# Patient Record
Sex: MALE | Race: WHITE | NOT HISPANIC OR LATINO | Employment: FULL TIME | URBAN - METROPOLITAN AREA
[De-identification: names, ages, dates, MRNs, and addresses within clinical notes are randomized per-mention and may not be internally consistent; named-entity substitution may affect disease eponyms.]

---

## 2023-04-05 ENCOUNTER — HOSPITAL ENCOUNTER (OUTPATIENT)
Dept: DATA CONVERSION | Facility: HOSPITAL | Age: 63
End: 2023-04-05
Attending: PHYSICAL MEDICINE & REHABILITATION | Admitting: PHYSICAL MEDICINE & REHABILITATION
Payer: COMMERCIAL

## 2023-04-05 DIAGNOSIS — M54.16 RADICULOPATHY, LUMBAR REGION: ICD-10-CM

## 2023-06-07 ENCOUNTER — HOSPITAL ENCOUNTER (OUTPATIENT)
Dept: DATA CONVERSION | Facility: HOSPITAL | Age: 63
End: 2023-06-07
Attending: PHYSICAL MEDICINE & REHABILITATION | Admitting: PHYSICAL MEDICINE & REHABILITATION
Payer: COMMERCIAL

## 2023-06-07 DIAGNOSIS — M54.16 RADICULOPATHY, LUMBAR REGION: ICD-10-CM

## 2023-08-23 ENCOUNTER — HOSPITAL ENCOUNTER (OUTPATIENT)
Dept: DATA CONVERSION | Facility: HOSPITAL | Age: 63
Discharge: HOME | End: 2023-08-23
Payer: COMMERCIAL

## 2023-08-23 DIAGNOSIS — R22.31 LOCALIZED SWELLING, MASS AND LUMP, RIGHT UPPER LIMB: ICD-10-CM

## 2023-09-14 NOTE — H&P
History & Physical Reviewed:   I have reviewed the History and Physical dated:  05-Apr-2023   History and Physical reviewed and relevant findings noted. Patient examined to review pertinent physical  findings.: No significant changes   Home Medications Reviewed: no changes noted   Allergies Reviewed: no changes noted       ERAS (Enhanced Recovery After Surgery):  ·  ERAS Patient: no     Consent:   COVID-19 Consent:  ·  COVID-19 Risk Consent Surgeon has reviewed key risks related to the risk of magaly COVID-19 and if they contract COVID-19 what the risks are.       Electronic Signatures:  Bhaskar Toledo)  (Signed 05-Apr-2023 08:12)   Authored: History & Physical Reviewed, ERAS, Consent,  Note Completion      Last Updated: 05-Apr-2023 08:12 by Bhaskar Toledo)

## 2023-09-30 NOTE — H&P
History & Physical Reviewed:   I have reviewed the History and Physical dated:  07-Jun-2023   History and Physical reviewed and relevant findings noted. Patient examined to review pertinent physical  findings.: No significant changes   Home Medications Reviewed: no changes noted   Allergies Reviewed: no changes noted       ERAS (Enhanced Recovery After Surgery):  ·  ERAS Patient: no     Consent:   COVID-19 Consent:  ·  COVID-19 Risk Consent Surgeon has reviewed key risks related to the risk of magaly COVID-19 and if they contract COVID-19 what the risks are.       Electronic Signatures:  Bhaskar Toledo)  (Signed 07-Jun-2023 07:40)   Authored: History & Physical Reviewed, ERAS, Consent,  Note Completion      Last Updated: 07-Jun-2023 07:40 by Bhaskar Toledo)

## 2023-12-13 ENCOUNTER — OFFICE VISIT (OUTPATIENT)
Dept: OTOLARYNGOLOGY | Facility: CLINIC | Age: 63
End: 2023-12-13
Payer: COMMERCIAL

## 2023-12-13 VITALS — WEIGHT: 185 LBS | BODY MASS INDEX: 29.03 KG/M2 | TEMPERATURE: 97.3 F | HEIGHT: 67 IN

## 2023-12-13 DIAGNOSIS — H92.11 OTORRHEA OF RIGHT EAR: ICD-10-CM

## 2023-12-13 DIAGNOSIS — H61.23 BILATERAL IMPACTED CERUMEN: Primary | ICD-10-CM

## 2023-12-13 DIAGNOSIS — H69.91 DYSFUNCTION OF RIGHT EUSTACHIAN TUBE: ICD-10-CM

## 2023-12-13 DIAGNOSIS — H90.3 ASYMMETRIC SENSORINEURAL DEAFNESS: ICD-10-CM

## 2023-12-13 PROCEDURE — 1036F TOBACCO NON-USER: CPT | Performed by: OTOLARYNGOLOGY

## 2023-12-13 PROCEDURE — 99214 OFFICE O/P EST MOD 30 MIN: CPT | Performed by: OTOLARYNGOLOGY

## 2023-12-13 ASSESSMENT — PATIENT HEALTH QUESTIONNAIRE - PHQ9
2. FEELING DOWN, DEPRESSED OR HOPELESS: NOT AT ALL
1. LITTLE INTEREST OR PLEASURE IN DOING THINGS: NOT AT ALL
SUM OF ALL RESPONSES TO PHQ9 QUESTIONS 1 & 2: 0

## 2023-12-13 NOTE — PROGRESS NOTES
"Chief Complaint   Patient presents with    Follow-up     LOV 5/23 RT TUBE CK      Date of Evaluation: 12/13/2023   HPI  Angelo Lew is a 63 y.o. male deaf in the right ear with a tube in place  History: former patient of Dr. Davis. He had a right sudden sensorineural hearing loss resulting in a profound hearing loss. He also had right-sided eustachian tube dysfunction requiring placement of a T-tube January 2019. He does swim. He has occasional otorrhea. No change in his hearing recently        Past Medical History:   Diagnosis Date    Other specified disorders of eustachian tube, right ear 12/28/2021    Dysfunction of right eustachian tube    Other specified disorders of eustachian tube, right ear 12/28/2021    Dysfunction of right eustachian tube      Past Surgical History:   Procedure Laterality Date    CT ANGIO CORONARY ART WITH HEARTFLOW IF SCORE >30%  11/27/2020    CT HEART CORONARY ANGIOGRAM 11/27/2020 [facility] ANCILLARY LEGACY    OTHER SURGICAL HISTORY  05/28/2021    Ankle surgery    OTHER SURGICAL HISTORY  05/28/2021    Back surgery    OTHER SURGICAL HISTORY  12/28/2021    Myringotomy with tube placement    TONSILLECTOMY            Medications:   Current Outpatient Medications   Medication Instructions    aspirin-calcium carbonate 81 mg-300 mg calcium(777 mg) tablet oral    LORazepam (Ativan) 0.5 mg tablet TAKE 1 TABLET BY MOUTH TWO TIMES A DAY AS NEEDED FOR ANXIETY    traMADol (Ultram) 50 mg tablet TAKE 1 TABLET BY MOUTH EVERY 6 HOURS AS NEEDED FOR PAIN        Allergies:  No Known Allergies     Physical Exam:  Last Recorded Vitals  Temperature 36.3 °C (97.3 °F), height 1.702 m (5' 7\"), weight 83.9 kg (185 lb).  []General appearance: Well-developed, well-nourished in no acute distress, conversant with normal voice quality    Head/face: No erythema or edema or facial tenderness, and normal facial nerve function bilaterally    External ear: Clear external auditory canals with normal pinnae, bilateral cerumen " impactions removed.  There is purulence still around the right tube on the lateral surface of the tympanic membrane  Tube status: Right T-tube in place  Middle ear: Tympanic membranes intact and mobile, middle ears normal.  Tympanic membrane perforation: N/A  Mastoid bowl: N/A  Hearing: Normal conversational awareness at normal speech thresholds    Nose visualized using: Anterior rhinoscopy  Nasal dorsum: Nontraumatic midline appearance  Septum: Midline, nonobstructing  Inferior turbinates: Normal, pink  Secretions: Dry    Oral cavity and oropharynx: Normal  Teeth: Good condition  Floor of mouth: without lesions  Palate: Normal hard palate, soft palate and uvula  Oropharynx: Clear, no lesions present  Buccal mucosa: Normal without masses or lesions  Lips: Normal    Nasopharynx: Inadequate mirror exam secondary to gag/anatomy    Neck:  Salivary glands: Normal bilateral parotid and submandibular glands by inspection and palpation.  Non-thyroid masses: No palpable masses or significant lymphadenopathy  Trachea: Midline  Thyroid: No thyromegaly or palpable nodules  Temporomandibular joint: Nontender  Cervical range of motion: Normal    Neurologic exam: Alert and oriented x3, appropriate affect.  Cranial nerves II-XII normal bilaterally  Extraocular movement: Extraocular movement intact, normal gaze alignment        Angelo was seen today for follow-up.  Diagnoses and all orders for this visit:  Bilateral impacted cerumen (Primary)  Dysfunction of right eustachian tube  Asymmetric sensorineural deafness  Otorrhea of right ear       PLAN  Ciprodex in the right ear for 4 days.  Recheck in 6 months with an audiogram    Rolando Dyer MD

## 2024-04-19 ENCOUNTER — TELEPHONE (OUTPATIENT)
Dept: PRIMARY CARE | Facility: CLINIC | Age: 64
End: 2024-04-19
Payer: COMMERCIAL

## 2024-04-19 DIAGNOSIS — Z13.220 LIPID SCREENING: ICD-10-CM

## 2024-04-19 DIAGNOSIS — Z12.5 SCREENING PSA (PROSTATE SPECIFIC ANTIGEN): ICD-10-CM

## 2024-04-19 DIAGNOSIS — Z00.00 WELL ADULT EXAM: ICD-10-CM

## 2024-06-07 ENCOUNTER — OFFICE VISIT (OUTPATIENT)
Dept: PRIMARY CARE | Facility: CLINIC | Age: 64
End: 2024-06-07
Payer: COMMERCIAL

## 2024-06-07 ENCOUNTER — PHARMACY VISIT (OUTPATIENT)
Dept: PHARMACY | Facility: CLINIC | Age: 64
End: 2024-06-07
Payer: COMMERCIAL

## 2024-06-07 VITALS
HEIGHT: 67 IN | OXYGEN SATURATION: 98 % | WEIGHT: 184 LBS | BODY MASS INDEX: 28.88 KG/M2 | TEMPERATURE: 96.3 F | DIASTOLIC BLOOD PRESSURE: 90 MMHG | HEART RATE: 89 BPM | SYSTOLIC BLOOD PRESSURE: 138 MMHG

## 2024-06-07 DIAGNOSIS — L23.7 ALLERGIC DERMATITIS DUE TO POISON OAK: Primary | ICD-10-CM

## 2024-06-07 PROBLEM — D18.03 HEMANGIOMA OF LIVER: Status: RESOLVED | Noted: 2024-06-07 | Resolved: 2024-06-07

## 2024-06-07 PROBLEM — F99 INSOMNIA DUE TO OTHER MENTAL DISORDER: Status: ACTIVE | Noted: 2018-08-29

## 2024-06-07 PROBLEM — F51.05 INSOMNIA DUE TO OTHER MENTAL DISORDER: Status: ACTIVE | Noted: 2018-08-29

## 2024-06-07 PROBLEM — N52.9 ERECTILE DYSFUNCTION: Status: ACTIVE | Noted: 2024-06-07

## 2024-06-07 PROBLEM — S82.409A FRACTURE OF FIBULA: Status: RESOLVED | Noted: 2024-06-07 | Resolved: 2024-06-07

## 2024-06-07 PROBLEM — H92.11 OTORRHEA OF RIGHT EAR: Status: RESOLVED | Noted: 2024-06-07 | Resolved: 2024-06-07

## 2024-06-07 PROBLEM — R73.01 IMPAIRED FASTING GLUCOSE: Status: ACTIVE | Noted: 2018-10-09

## 2024-06-07 PROBLEM — I31.9 PERICARDITIS (HHS-HCC): Status: RESOLVED | Noted: 2024-06-07 | Resolved: 2024-06-07

## 2024-06-07 PROBLEM — S92.909K NONUNION OF FRACTURE OF ANKLE AND FOOT: Status: RESOLVED | Noted: 2024-06-07 | Resolved: 2024-06-07

## 2024-06-07 PROBLEM — M54.10 RADICULOPATHY: Status: ACTIVE | Noted: 2023-01-06

## 2024-06-07 PROBLEM — S82.899K NONUNION OF FRACTURE OF ANKLE AND FOOT: Status: RESOLVED | Noted: 2024-06-07 | Resolved: 2024-06-07

## 2024-06-07 PROBLEM — F41.9 ANXIETY DISORDER: Status: ACTIVE | Noted: 2018-08-29

## 2024-06-07 PROBLEM — K64.8 PROLAPSED INTERNAL HEMORRHOIDS: Status: RESOLVED | Noted: 2024-06-07 | Resolved: 2024-06-07

## 2024-06-07 PROBLEM — L92.9 GRANULATION TISSUE OF EAR CANAL: Status: RESOLVED | Noted: 2024-06-07 | Resolved: 2024-06-07

## 2024-06-07 PROBLEM — H90.41 SENSORINEURAL HEARING LOSS (SNHL) OF RIGHT EAR WITH UNRESTRICTED HEARING OF LEFT EAR: Status: RESOLVED | Noted: 2024-06-07 | Resolved: 2024-06-07

## 2024-06-07 PROCEDURE — RXMED WILLOW AMBULATORY MEDICATION CHARGE

## 2024-06-07 PROCEDURE — 99213 OFFICE O/P EST LOW 20 MIN: CPT

## 2024-06-07 PROCEDURE — 1036F TOBACCO NON-USER: CPT

## 2024-06-07 RX ORDER — PREDNISONE 10 MG/1
TABLET ORAL
Qty: 30 TABLET | Refills: 0 | Status: SHIPPED | OUTPATIENT
Start: 2024-06-07 | End: 2024-06-17

## 2024-06-07 ASSESSMENT — PAIN SCALES - GENERAL: PAINLEVEL: 0-NO PAIN

## 2024-06-07 ASSESSMENT — PATIENT HEALTH QUESTIONNAIRE - PHQ9
2. FEELING DOWN, DEPRESSED OR HOPELESS: NOT AT ALL
SUM OF ALL RESPONSES TO PHQ9 QUESTIONS 1 AND 2: 0
1. LITTLE INTEREST OR PLEASURE IN DOING THINGS: NOT AT ALL

## 2024-06-07 NOTE — PROGRESS NOTES
"Subjective   Patient ID: Angelo Lew is a 63 y.o. male who presents for Rash (Exposed to poison oak).    HPI   Angelo presents with concerns for itchy rash after completing yard work this past weekend, exposed to poison oak.  Rash to bilateral arms, legs, right and left sided of his chest and along right flank, beginning to spread to the nape of neck and back of head.  He has been taking benadryl and using calamine lotion with no change.     Review of Systems  All other systems have been reviewed and are negative except as noted in the HPI.         Objective   /90 (BP Location: Left arm)   Pulse 89   Temp 35.7 °C (96.3 °F) (Temporal)   Ht 1.702 m (5' 7\")   Wt 83.5 kg (184 lb)   SpO2 98%   BMI 28.82 kg/m²     Physical Exam  Vitals and nursing note reviewed.   Constitutional:       General: He is not in acute distress.     Appearance: Normal appearance.   Skin:            Comments: Erythematous raised rash, no drainage noted.    Neurological:      Mental Status: He is alert.   Psychiatric:         Behavior: Behavior is cooperative.         Assessment/Plan   Problem List Items Addressed This Visit    None  Visit Diagnoses         Codes    Allergic dermatitis due to poison oak    -  Primary  Began oral prednisone taper (50 mg x 2 days, 40 mg x 2 days, 30 mg x 2 days, 20 mg x 2 days, 10 mg x 2 days) as prescribed. Explained intended effects, potential side effects, and schedule of dosages of the medication.  Recommend patient apply calamine or hydrocortisone twice a day as needed for itching.    May take OTC medications as discussed, including benadryl  Follow-up for erythema, purulent drainage, fever, or other signs of infection.  L23.7    Relevant Medications    predniSONE (Deltasone) 10 mg tablet               "

## 2024-06-14 ENCOUNTER — APPOINTMENT (OUTPATIENT)
Dept: AUDIOLOGY | Facility: CLINIC | Age: 64
End: 2024-06-14
Payer: COMMERCIAL

## 2024-06-14 ENCOUNTER — APPOINTMENT (OUTPATIENT)
Dept: OTOLARYNGOLOGY | Facility: CLINIC | Age: 64
End: 2024-06-14
Payer: COMMERCIAL

## 2024-06-21 ENCOUNTER — APPOINTMENT (OUTPATIENT)
Dept: AUDIOLOGY | Facility: CLINIC | Age: 64
End: 2024-06-21
Payer: COMMERCIAL

## 2024-06-21 ENCOUNTER — APPOINTMENT (OUTPATIENT)
Dept: OTOLARYNGOLOGY | Facility: CLINIC | Age: 64
End: 2024-06-21
Payer: COMMERCIAL

## 2024-06-21 VITALS — WEIGHT: 183 LBS | BODY MASS INDEX: 28.72 KG/M2 | HEIGHT: 67 IN

## 2024-06-21 DIAGNOSIS — H90.3 ASYMMETRIC SENSORINEURAL DEAFNESS: ICD-10-CM

## 2024-06-21 DIAGNOSIS — H90.41 SENSORINEURAL HEARING LOSS (SNHL) OF RIGHT EAR WITH UNRESTRICTED HEARING OF LEFT EAR: Primary | ICD-10-CM

## 2024-06-21 DIAGNOSIS — H69.91 DYSFUNCTION OF RIGHT EUSTACHIAN TUBE: Primary | ICD-10-CM

## 2024-06-21 DIAGNOSIS — H61.23 BILATERAL IMPACTED CERUMEN: ICD-10-CM

## 2024-06-21 PROCEDURE — 1036F TOBACCO NON-USER: CPT | Performed by: OTOLARYNGOLOGY

## 2024-06-21 PROCEDURE — 92557 COMPREHENSIVE HEARING TEST: CPT | Performed by: AUDIOLOGIST

## 2024-06-21 PROCEDURE — 92550 TYMPANOMETRY & REFLEX THRESH: CPT | Performed by: AUDIOLOGIST

## 2024-06-21 PROCEDURE — 99213 OFFICE O/P EST LOW 20 MIN: CPT | Performed by: OTOLARYNGOLOGY

## 2024-06-21 NOTE — PROGRESS NOTES
Chief Complaint   Patient presents with    Ear Tube Check     LOV: 12/2023 RT TUBE CK, HAD AUDIO DONE      Date of Evaluation: 6/21/2024   HPI  Angelo Lew is a 63 y.o. male deaf in the right ear with a tube in place.  He has not been using his CROS hearing aids.  His audiogram today shows stable profound right-sided sensorineural hearing loss and normal hearing on the left  History: former patient of Dr. Davis. He had a right sudden sensorineural hearing loss resulting in a profound hearing loss. He also had right-sided eustachian tube dysfunction requiring placement of a T-tube January 2019. He does swim. He has occasional otorrhea. No change in his hearing recently        Past Medical History:   Diagnosis Date    Fracture of fibula 06/07/2024    Granulation tissue of ear canal 06/07/2024    Hemangioma of liver 06/07/2024    Other specified disorders of eustachian tube, right ear 12/28/2021    Dysfunction of right eustachian tube    Other specified disorders of eustachian tube, right ear 12/28/2021    Dysfunction of right eustachian tube    Otorrhea of right ear 06/07/2024    Pericarditis ([facility]) 06/07/2024    Prolapsed internal hemorrhoids 06/07/2024      Past Surgical History:   Procedure Laterality Date    CT ANGIO CORONARY ART WITH HEARTFLOW IF SCORE >30%  11/27/2020    CT HEART CORONARY ANGIOGRAM 11/27/2020 [facility] ANCILLARY LEGACY    OTHER SURGICAL HISTORY  05/28/2021    Ankle surgery    OTHER SURGICAL HISTORY  05/28/2021    Back surgery    OTHER SURGICAL HISTORY  12/28/2021    Myringotomy with tube placement    TONSILLECTOMY            Medications:   Current Outpatient Medications   Medication Instructions    aspirin-calcium carbonate 81 mg-300 mg calcium(777 mg) tablet oral    LORazepam (Ativan) 0.5 mg tablet TAKE 1 TABLET BY MOUTH TWO TIMES A DAY AS NEEDED FOR ANXIETY        Allergies:  Allergies   Allergen Reactions    Monosodium Glutamate (Bulk) Headache        Physical Exam:  Last Recorded  "Vitals  Height 1.702 m (5' 7\"), weight 83 kg (183 lb).  []General appearance: Well-developed, well-nourished in no acute distress, conversant with normal voice quality    Head/face: No erythema or edema or facial tenderness, and normal facial nerve function bilaterally    External ear: Clear external auditory canals with normal pinnae, bilateral cerumen impactions removed.    Tube status: Right T-tube in place  Middle ear: Tympanic membranes intact and mobile, middle ears normal.  Tympanic membrane perforation: N/A  Mastoid bowl: N/A  Hearing: Normal conversational awareness at normal speech thresholds    Nose visualized using: Anterior rhinoscopy  Nasal dorsum: Nontraumatic midline appearance  Septum: Midline, nonobstructing  Inferior turbinates: Normal, pink  Secretions: Dry    Oral cavity and oropharynx: Normal  Teeth: Good condition  Floor of mouth: without lesions  Palate: Normal hard palate, soft palate and uvula  Oropharynx: Clear, no lesions present  Buccal mucosa: Normal without masses or lesions  Lips: Normal    Nasopharynx: Inadequate mirror exam secondary to gag/anatomy    Neck:  Salivary glands: Normal bilateral parotid and submandibular glands by inspection and palpation.  Non-thyroid masses: No palpable masses or significant lymphadenopathy  Trachea: Midline  Thyroid: No thyromegaly or palpable nodules  Temporomandibular joint: Nontender  Cervical range of motion: Normal    Neurologic exam: Alert and oriented x3, appropriate affect.  Cranial nerves II-XII normal bilaterally  Extraocular movement: Extraocular movement intact, normal gaze alignment        Angelo was seen today for ear tube check.  Diagnoses and all orders for this visit:  Dysfunction of right eustachian tube (Primary)  Asymmetric sensorineural deafness  Bilateral impacted cerumen         PLAN  I have recommended repairing his CROS hearing aids and using them more consistently.  Recheck in 6 months     Rolando Dyer MD    "

## 2024-06-23 NOTE — PROGRESS NOTES
AUDIOLOGY ADULT AUDIOMETRIC EVALUATION    Name:  Angelo Lew  :  1960  Age:  63 y.o.  Date of Evaluation:  2024    Reason for visit: Angelo is seen in the clinic today at the request of otolaryngology for an audiologic evaluation.     HISTORY  Patient reports history of significant sudden sensorineural hearing loss .   He was fit with a CROS aid and has not been utilizing it; having difficulty with it holding a charge.  Patient reports he has a T Tube in right ear from 2019.   Left ear hearing well.   Patient reports no dizziness or imbalance.      EVALUATION  See scanned audiogram: “Media” > “Audiology Report”.      RESULTS  Otoscopic Evaluation:  Right Ear: clear ear canal  Left Ear: clear ear canal    Immittance Measures:  Tympanometry:  Right Ear: large ear canal volume consistent with a patent pressure equalization tube  Left Ear: Type A, normal tympanic membrane mobility with normal middle ear pressure    Acoustic Reflexes:  Ipsilateral Right Ear: dne  Ipsilateral Left Ear: Acoustic reflexes present within normal limits 500Hz through 4KHz   Contralateral Right Ear: did not evaluate  Contralateral Left Ear: did not evaluate    Audiometry:  Test Technique and Reliability:   Standard audiometry via supra-aural headphones. Reliability is good.    Pure tone air and bone conduction audiometry:  Right Ear: Mild precipitously sloping to profound sensorineural hearing loss  Left Ear: Hearing sensitivity within normal limits 250Hz through 6KHz; mild decrease at 8KHz     Speech Audiometry (Word Recognition Scores):   Right Ear: poor (12%)  Left Ear: Excellent     IMPRESSIONS    Right ear: stable mild precipitously sloping to profound sensorineural hearing loss with poor word discrimination  Left ear: normal; mild hearing loss at 8KHz     RECOMMENDATIONS  - Follow up with otolaryngology today as scheduled.  - Audiologic evaluation as needed.  - Schedule hearing aid check to evaluate ; hearing  aid options     PATIENT EDUCATION  Discussed results, impressions and recommendations with the patient. Questions were addressed and the patient was encouraged to contact our office should concerns arise.    Time for this encounter: 930/1000    Jahaira Tamayo M.A., CCC/A   Licensed Audiologist

## 2024-07-08 ENCOUNTER — LAB (OUTPATIENT)
Dept: LAB | Facility: LAB | Age: 64
End: 2024-07-08
Payer: COMMERCIAL

## 2024-07-08 DIAGNOSIS — Z13.220 LIPID SCREENING: ICD-10-CM

## 2024-07-08 DIAGNOSIS — Z00.00 WELL ADULT EXAM: ICD-10-CM

## 2024-07-08 DIAGNOSIS — Z12.5 SCREENING PSA (PROSTATE SPECIFIC ANTIGEN): ICD-10-CM

## 2024-07-08 LAB
ALBUMIN SERPL BCP-MCNC: 4.3 G/DL (ref 3.4–5)
ALP SERPL-CCNC: 76 U/L (ref 33–136)
ALT SERPL W P-5'-P-CCNC: 13 U/L (ref 10–52)
ANION GAP SERPL CALC-SCNC: 12 MMOL/L (ref 10–20)
APPEARANCE UR: ABNORMAL
AST SERPL W P-5'-P-CCNC: 14 U/L (ref 9–39)
BACTERIA #/AREA URNS AUTO: ABNORMAL /HPF
BASOPHILS # BLD AUTO: 0.03 X10*3/UL (ref 0–0.1)
BASOPHILS NFR BLD AUTO: 0.5 %
BILIRUB SERPL-MCNC: 0.7 MG/DL (ref 0–1.2)
BILIRUB UR STRIP.AUTO-MCNC: NEGATIVE MG/DL
BUN SERPL-MCNC: 23 MG/DL (ref 6–23)
CALCIUM SERPL-MCNC: 9.2 MG/DL (ref 8.6–10.3)
CHLORIDE SERPL-SCNC: 101 MMOL/L (ref 98–107)
CHOLEST SERPL-MCNC: 224 MG/DL (ref 0–199)
CHOLESTEROL/HDL RATIO: 2.4
CO2 SERPL-SCNC: 29 MMOL/L (ref 21–32)
COLOR UR: ABNORMAL
CREAT SERPL-MCNC: 1.2 MG/DL (ref 0.5–1.3)
EGFRCR SERPLBLD CKD-EPI 2021: 68 ML/MIN/1.73M*2
EOSINOPHIL # BLD AUTO: 0.13 X10*3/UL (ref 0–0.7)
EOSINOPHIL NFR BLD AUTO: 2.3 %
ERYTHROCYTE [DISTWIDTH] IN BLOOD BY AUTOMATED COUNT: 13.7 % (ref 11.5–14.5)
GLUCOSE SERPL-MCNC: 128 MG/DL (ref 74–99)
GLUCOSE UR STRIP.AUTO-MCNC: NORMAL MG/DL
HCT VFR BLD AUTO: 41.8 % (ref 41–52)
HDLC SERPL-MCNC: 92.7 MG/DL
HGB BLD-MCNC: 13.6 G/DL (ref 13.5–17.5)
IMM GRANULOCYTES # BLD AUTO: 0.03 X10*3/UL (ref 0–0.7)
IMM GRANULOCYTES NFR BLD AUTO: 0.5 % (ref 0–0.9)
KETONES UR STRIP.AUTO-MCNC: NEGATIVE MG/DL
LDLC SERPL CALC-MCNC: 122 MG/DL
LEUKOCYTE ESTERASE UR QL STRIP.AUTO: ABNORMAL
LYMPHOCYTES # BLD AUTO: 1.42 X10*3/UL (ref 1.2–4.8)
LYMPHOCYTES NFR BLD AUTO: 25.1 %
MCH RBC QN AUTO: 29.6 PG (ref 26–34)
MCHC RBC AUTO-ENTMCNC: 32.5 G/DL (ref 32–36)
MCV RBC AUTO: 91 FL (ref 80–100)
MONOCYTES # BLD AUTO: 0.42 X10*3/UL (ref 0.1–1)
MONOCYTES NFR BLD AUTO: 7.4 %
MUCOUS THREADS #/AREA URNS AUTO: ABNORMAL /LPF
NEUTROPHILS # BLD AUTO: 3.62 X10*3/UL (ref 1.2–7.7)
NEUTROPHILS NFR BLD AUTO: 64.2 %
NITRITE UR QL STRIP.AUTO: NEGATIVE
NON HDL CHOLESTEROL: 131 MG/DL (ref 0–149)
NRBC BLD-RTO: 0 /100 WBCS (ref 0–0)
PH UR STRIP.AUTO: 5.5 [PH]
PLATELET # BLD AUTO: 324 X10*3/UL (ref 150–450)
POTASSIUM SERPL-SCNC: 4.4 MMOL/L (ref 3.5–5.3)
PROT SERPL-MCNC: 6.6 G/DL (ref 6.4–8.2)
PROT UR STRIP.AUTO-MCNC: NEGATIVE MG/DL
RBC # BLD AUTO: 4.59 X10*6/UL (ref 4.5–5.9)
RBC # UR STRIP.AUTO: NEGATIVE /UL
RBC #/AREA URNS AUTO: ABNORMAL /HPF
SODIUM SERPL-SCNC: 138 MMOL/L (ref 136–145)
SP GR UR STRIP.AUTO: 1.02
TRIGL SERPL-MCNC: 49 MG/DL (ref 0–149)
UROBILINOGEN UR STRIP.AUTO-MCNC: NORMAL MG/DL
VLDL: 10 MG/DL (ref 0–40)
WBC # BLD AUTO: 5.7 X10*3/UL (ref 4.4–11.3)
WBC #/AREA URNS AUTO: ABNORMAL /HPF

## 2024-07-08 PROCEDURE — 81001 URINALYSIS AUTO W/SCOPE: CPT

## 2024-07-08 PROCEDURE — 36415 COLL VENOUS BLD VENIPUNCTURE: CPT

## 2024-07-08 PROCEDURE — 80053 COMPREHEN METABOLIC PANEL: CPT

## 2024-07-08 PROCEDURE — 80061 LIPID PANEL: CPT

## 2024-07-08 PROCEDURE — 84153 ASSAY OF PSA TOTAL: CPT

## 2024-07-08 PROCEDURE — 85025 COMPLETE CBC W/AUTO DIFF WBC: CPT

## 2024-07-09 LAB — PSA SERPL-MCNC: 0.8 NG/ML

## 2024-07-19 ENCOUNTER — APPOINTMENT (OUTPATIENT)
Dept: AUDIOLOGY | Facility: CLINIC | Age: 64
End: 2024-07-19

## 2024-07-19 ENCOUNTER — APPOINTMENT (OUTPATIENT)
Dept: PRIMARY CARE | Facility: CLINIC | Age: 64
End: 2024-07-19
Payer: COMMERCIAL

## 2024-07-19 VITALS
HEART RATE: 75 BPM | HEIGHT: 67 IN | BODY MASS INDEX: 28.72 KG/M2 | SYSTOLIC BLOOD PRESSURE: 134 MMHG | DIASTOLIC BLOOD PRESSURE: 80 MMHG | WEIGHT: 183 LBS | OXYGEN SATURATION: 97 %

## 2024-07-19 DIAGNOSIS — F41.9 ANXIETY DISORDER, UNSPECIFIED TYPE: ICD-10-CM

## 2024-07-19 DIAGNOSIS — R73.01 IMPAIRED FASTING GLUCOSE: ICD-10-CM

## 2024-07-19 DIAGNOSIS — I83.11 VARICOSE VEINS OF BOTH LOWER EXTREMITIES WITH INFLAMMATION: ICD-10-CM

## 2024-07-19 DIAGNOSIS — N52.01 ERECTILE DYSFUNCTION DUE TO ARTERIAL INSUFFICIENCY: ICD-10-CM

## 2024-07-19 DIAGNOSIS — I83.12 VARICOSE VEINS OF BOTH LOWER EXTREMITIES WITH INFLAMMATION: ICD-10-CM

## 2024-07-19 DIAGNOSIS — Z00.00 WELL ADULT EXAM: Primary | ICD-10-CM

## 2024-07-19 DIAGNOSIS — B35.1 ONYCHOMYCOSIS: ICD-10-CM

## 2024-07-19 PROCEDURE — 3008F BODY MASS INDEX DOCD: CPT | Performed by: FAMILY MEDICINE

## 2024-07-19 PROCEDURE — RXMED WILLOW AMBULATORY MEDICATION CHARGE

## 2024-07-19 PROCEDURE — 1036F TOBACCO NON-USER: CPT | Performed by: FAMILY MEDICINE

## 2024-07-19 PROCEDURE — 99396 PREV VISIT EST AGE 40-64: CPT | Performed by: FAMILY MEDICINE

## 2024-07-19 RX ORDER — CICLOPIROX 80 MG/ML
SOLUTION TOPICAL NIGHTLY
Qty: 6.6 ML | Refills: 3 | Status: SHIPPED | OUTPATIENT
Start: 2024-07-19

## 2024-07-19 RX ORDER — ASPIRIN 81 MG/1
81 TABLET ORAL DAILY
COMMUNITY

## 2024-07-19 ASSESSMENT — ENCOUNTER SYMPTOMS
LOSS OF SENSATION IN FEET: 0
DEPRESSION: 0
OCCASIONAL FEELINGS OF UNSTEADINESS: 0

## 2024-07-19 ASSESSMENT — PAIN SCALES - GENERAL: PAINLEVEL: 0-NO PAIN

## 2024-07-19 NOTE — PROGRESS NOTES
[id number]  Subjective   Patient ID: Angelo Lew is a 64 y.o. male who presents for Annual Exam (EKG  7/2023/Colonoscopy done 9/2022 repeat 10 years per patient/Td  2022/Zoster  /Shingrix#1   today/Labs done 7/8/2024).    HPI Pt is seen for for his comprehensive physical exam. PMH, PSH, family history and social history were reviewed and updated. all other [facility] and Problem [facility].     Patient has a history of anxiety. He uses lorazepam as needed.  20 tablets lasts 10-12 months.  Patient has a history of pericarditis with a hospitalization in November/2020 .  He has not had any recurrence. He does not see Cardiology.  Patient lacerated the tip of his right thumb  years ago.  It required stitches in the emergency room.  Since that time he has developed a firm nodule underneath the scar and it is tender to the touch.  Patient has known back pain.  He has had a  fusion in the past.  The pain has recurred.  He is already seeing Pain Management in status post injection.  However the discomfort continues.  He is having tingling in his right foot with some pain to the lateral aspect of the ankle.  Patient had a tetanus shot in 2022.  Patient has never had shingles immunization because he is very sure that he has never had chickenpox.  He has had Varivax times 2.  Patient has been immunized against coronavirus x6 and he did have a flu shot in the fall 2023.   Patient had a colonoscopy in 2022..   Patient is a former tobacco user but quit in his mid 20s.   Patient does not use alcohol.    Review of Systems  Constitutional Symptoms:  He is negative for fever, night sweats, weight loss, Weight loss due to diet, weight gain due to diet, unexpected weight gain, loss of appetite, increased appetite, headaches, fatigue, abnormal activity level, Sleep Disturbance, Recent Illness.   Eyes:  He is negative for blindness, blind spots, loss and blurring of vision, double vision, swelling, redness,  pruritus, eye pain, discharge, dryness of eyes.   Ear, Nose, Mouth, Throat:  He is positive for hearing loss - (Andreafski AS). He is negative for wearing hearing aids, tinnitus, ear pain, ear drainage, dizziness, allergies, nasal congestion, rhinorrhea, nasal obstruction, post nasal drip, nose bleeds, teeth problems, wearing dentures, mouth sores, gum disease, dysphagia, hoarseness, sore throat, tinnitus, sleep apnea.   Cardiovascular:  He is negative for chest pain/pressure, radiation of pain, palpitations, shortness of breath, dyspnea on exertion, orthopnea, syncope, diaphoresis, cyanosis, edema.   Respiratory:  He is negative for shortness of breath, dyspnea on exertion, coughing, sputum, hemoptysis, wheezing, snoring.   Breast:  He is negative for masses, nipple discharge, gynecomastia.   Gastrointestinal:  He is negative for anorexia, indigestion, increased belching, food intolerance, use of antacids, nausea, hematemesis, jaundice, abdominal pain, change in bowel habits, diarrhea, constipation, abnormal stools, hematochezia, melena, blood in stool, increased flatus, hemorrhoids.   Male Genitourinary:  He is negative for erectile dysfunction, frequency, nocturia, hesitancy, dribbling, Incontinence, dysuria, hematuria, Swelling of penis, testicular pain or swelling, Hematospermia, urethral discharge.   Musculoskeletal:  He is positive for LBP.    He is positive for a painful nodule in an area of former trauma in the pad of the right thumb. He is negative for joint pain, joint swelling, joint warmth, joint redness, myalgias, cramps, nocturnal muscle cramps, weakness, stiffness, limitation of motion.   Integumentary:  He is negative for change in mole, skin trouble or rash, itching, dryness, loss of hair, hirsutism.   Neurological:  He is negative for headache, speech difficulty, numbness, tingling, weakness, paralysis, tremors, dizziness, syncope, balance problems, memory loss.   Psychiatric:  He is positive for  "insomnia.    He is positive for occasional anxiety. He is negative for depression, moodiness, anhedonia, , disturbing thoughts or feelings, change in libido, suicidal thoughts or attempts, panic attacks, obsessive thoughts, compulsions, hyperactivity.   Endocrine:  He is negative for weight gain, heat or cold intolerance, excessive sweating, polydipsia.   Hematologic/Lymphatic:  He is negative for bruising, abnormal bleeding, bleeding gums, nose bleeds, swollen glands.  Objective   /80 (BP Location: Left arm, Patient Position: Sitting, BP Cuff Size: Large adult)   Pulse 75   Ht 1.702 m (5' 7\")   Wt 83 kg (183 lb)   SpO2 97%   BMI 28.66 kg/m²     Physical Exam  General Appearance: YADIRA is in no acute distress. He is well nourished, well developed. The patient is awake and alert and appears stated age.  Head:   YADIRA's hair pattern is normal for patients age and The scalp is normal . The skull is normocephalic, atraumatic. The face is unremarkable with no facial droop. Palpation of the head reveals no tenderness or masses.  Eyes: PERRLA, EOMI, no scleral icterus.  Normal position and alignment. Eyebrows are normal.  Ears, Nose, Mouth, Throat:   EARS: External bilateral ears reveal normal helix, tragus and ear lobe.  Both canals are normal.  Tympanic membranes are pearly gray, normal landmarks, good light reflex.   MOUTH: Lips are normal with no lesion. Oral mucosa is moist.  Hard and soft palates are normal. Teeth are in good repair. Tongue reveals is normal. Tonsils are present with no lesions. Uvula is normal. Posterior pharynx without lesions.  Neck: Inspection of the neck reveals no masses or JVD.   Inspection reveals normal thyroid gland. Palpation shows normal thyroid gland. with No carotid bruit present.   Anterior cervical lymph node chains are unremarkable. Posterior chains are unremarkable.  Chest: Chest is symmetric. Lungs are clear to auscultation and percussion, no respiratory distress. Breathing " is normal.  Cardiovascular: Heart is RRR, normal S1, S2. No murmurs, rubs or gallops. PMI is normal in left 6th IC space midclavicular line. Femoral pulses are present and without bruits. DP pulses are present. Extremities: no edema, clubbing, cyanosis, or varicosities.  Breast: INSPECTION: Size and symmetry: Breasts are normal and symmetric .  Abdomen: Abdomen is soft, NT, ND. BS + 4 quadrants. No organomegaly or masses..   Genitourinary: Genital exam: Penis is circumcised without lesion or discharge.  Scrotum is normal.  There are no hernias palpable.  Testes are smooth and normal in size.   Lymph Nodes: Palpation of the cervical area are within normal limit. Palpation of the axillary area are within normal limit. Palpation of the inguinal area are within normal limit.  Musculoskeletal: Tender to palpation of the right low back.    He has a painful nodule about 5 millimeters in size over the area of a scar on the pad of the right thumb. Gait is normal. Extremities: Full Range of motion and strength throughout.  Skin: Has varicosities to distal BLE.  All nails of the feet are dystrophic.  Skin has no bruising, rash, eruptions, or abnormal appearing lesions.  Neurological: Intact and non-focal. Cranial nerves II - XII are grossly intact. Motor exams reveals normal tone and strength , Sensation: normal to touch, position and vibration. DTR: are +2/4 and symmetric at the AJ and KJ and no Babinski.  Psychiatric: Proper orientation to person, place and time. Recent memory is intact. Remote memory is intact. Patient's mood is normal with good eye contact. Affect is appropriate.  Assessment/Plan   Problem List Items Addressed This Visit             ICD-10-CM    Anxiety disorder  chronic, intermittetn. F41.9    BPH;  chronic, intermittent. N52.9    Impaired fasting glucose  Monitor. R73.01     Other Visit Diagnoses         Codes    Well adult exam    -  Primary  Normal exam with exceptions as noted. Z00.00    Onychomycosis     Needs better control B35.1    Relevant Medications    ciclopirox (Penlac) 8 % solution    Varicose veins of both lower extremities with inflammation    Needs referral. I83.11, I83.12    Relevant Orders    Referral to Vascular Surgery

## 2024-07-23 ENCOUNTER — TELEPHONE (OUTPATIENT)
Dept: PRIMARY CARE | Facility: CLINIC | Age: 64
End: 2024-07-23
Payer: COMMERCIAL

## 2024-07-23 NOTE — TELEPHONE ENCOUNTER
Spoke to patient.  He is aware he needs the shingles vaxxin series.  He will think about it and either call to come back here or go to his local pharmacy

## 2024-07-23 NOTE — TELEPHONE ENCOUNTER
----- Message from Kashmir Carrington sent at 7/23/2024  4:17 PM EDT -----  Regarding: shingrix  Please call patient to let him know that even though he has had the chickenpox vaccine that he does indeed need to have the shingles immunization series.  If you would like to have it done in this office we will arrange it.  If not give him the option to go to his local pharmacy.

## 2024-07-24 ENCOUNTER — PHARMACY VISIT (OUTPATIENT)
Dept: PHARMACY | Facility: CLINIC | Age: 64
End: 2024-07-24
Payer: COMMERCIAL

## 2024-08-09 ENCOUNTER — TELEPHONE (OUTPATIENT)
Dept: PRIMARY CARE | Facility: CLINIC | Age: 64
End: 2024-08-09
Payer: COMMERCIAL

## 2024-08-09 DIAGNOSIS — Z23 ENCOUNTER FOR IMMUNIZATION: Primary | ICD-10-CM

## 2024-08-09 DIAGNOSIS — E78.5 HYPERLIPIDEMIA, UNSPECIFIED HYPERLIPIDEMIA TYPE: Primary | ICD-10-CM

## 2024-08-09 NOTE — TELEPHONE ENCOUNTER
Patient called [phone] he would like to get the first shingles vaccine, ok for MA schedule ? Told him PJ is out

## 2024-08-09 NOTE — TELEPHONE ENCOUNTER
Patient called he would like a Coronary calcium score order as his cholesterol was elevated told him MM is out

## 2024-08-13 ENCOUNTER — CLINICAL SUPPORT (OUTPATIENT)
Dept: PRIMARY CARE | Facility: CLINIC | Age: 64
End: 2024-08-13
Payer: COMMERCIAL

## 2024-08-13 ENCOUNTER — HOSPITAL ENCOUNTER (OUTPATIENT)
Dept: RADIOLOGY | Facility: HOSPITAL | Age: 64
Discharge: HOME | End: 2024-08-13
Payer: COMMERCIAL

## 2024-08-13 ENCOUNTER — TELEPHONE (OUTPATIENT)
Dept: PRIMARY CARE | Facility: CLINIC | Age: 64
End: 2024-08-13

## 2024-08-13 VITALS
WEIGHT: 183 LBS | BODY MASS INDEX: 28.66 KG/M2 | TEMPERATURE: 98 F | DIASTOLIC BLOOD PRESSURE: 80 MMHG | SYSTOLIC BLOOD PRESSURE: 124 MMHG

## 2024-08-13 DIAGNOSIS — Z23 ENCOUNTER FOR IMMUNIZATION: ICD-10-CM

## 2024-08-13 DIAGNOSIS — E78.5 HYPERLIPIDEMIA, UNSPECIFIED HYPERLIPIDEMIA TYPE: ICD-10-CM

## 2024-08-13 PROCEDURE — 75571 CT HRT W/O DYE W/CA TEST: CPT

## 2024-08-13 PROCEDURE — 90750 HZV VACC RECOMBINANT IM: CPT | Performed by: FAMILY MEDICINE

## 2024-08-13 PROCEDURE — 90471 IMMUNIZATION ADMIN: CPT | Performed by: FAMILY MEDICINE

## 2024-08-15 DIAGNOSIS — Z23 ENCOUNTER FOR IMMUNIZATION: Primary | ICD-10-CM

## 2024-08-26 PROCEDURE — RXMED WILLOW AMBULATORY MEDICATION CHARGE

## 2024-08-29 ENCOUNTER — PHARMACY VISIT (OUTPATIENT)
Dept: PHARMACY | Facility: CLINIC | Age: 64
End: 2024-08-29
Payer: COMMERCIAL

## 2024-10-01 ENCOUNTER — CLINICAL SUPPORT (OUTPATIENT)
Dept: AUDIOLOGY | Facility: CLINIC | Age: 64
End: 2024-10-01

## 2024-10-01 DIAGNOSIS — H90.41 SENSORINEURAL HEARING LOSS (SNHL) OF RIGHT EAR WITH UNRESTRICTED HEARING OF LEFT EAR: Primary | ICD-10-CM

## 2024-10-01 PROCEDURE — HRANC PR HEARING AID NO CHARGE: Performed by: AUDIOLOGIST

## 2024-10-01 NOTE — PROGRESS NOTES
HEARING AID EVALUATION    The patient was seen today for a hearing aid evaluation.  He was fit with a Phonak cros hearing aid system approximately five years ago.  The patient reported that he stopped wearing the device because he did not perceive much benefit from it.  He is struggling to hear, especially in background noise.  He wanted to try to wear the aids again, but they are not functioning.  The patient has insurance through his [facility] insurance.  Olivia believes they will pay 90% of the cost of the aids once the patient meets his deductible.  Encouraged the patient to call his insurance to receive his exact benefit.  He is possibly interested in a Resound Nexia aid and cros.  He measures a #1 for both ears.  If he decides to order the aids, he would like to bring his wife to our [place] facility so I can try the various colors on him.  Let him know to call by the beginning of December if he would like to be fit with the aids in this calendar year.  N/C    APPOINTMENT TIME: 1:30-2:30

## 2024-10-02 PROCEDURE — 88304 TISSUE EXAM BY PATHOLOGIST: CPT

## 2024-10-03 ENCOUNTER — LAB REQUISITION (OUTPATIENT)
Dept: LAB | Facility: HOSPITAL | Age: 64
End: 2024-10-03
Payer: COMMERCIAL

## 2024-10-03 DIAGNOSIS — R22.31 LOCALIZED SWELLING, MASS AND LUMP, RIGHT UPPER LIMB: ICD-10-CM

## 2024-10-04 LAB
LABORATORY COMMENT REPORT: NORMAL
PATH REPORT.FINAL DX SPEC: NORMAL
PATH REPORT.GROSS SPEC: NORMAL
PATH REPORT.RELEVANT HX SPEC: NORMAL
PATH REPORT.TOTAL CANCER: NORMAL

## 2024-10-08 ENCOUNTER — TELEPHONE (OUTPATIENT)
Dept: AUDIOLOGY | Facility: CLINIC | Age: 64
End: 2024-10-08
Payer: COMMERCIAL

## 2024-10-08 NOTE — TELEPHONE ENCOUNTER
Mr. Lew came in for a brief visit to choose his hearing aid color. He decided on Warm Gray (71) and would like to go ahead with hearing aid purchase. PO was ordered and January was told to move forward with getting pre-authorization from  insurance.

## 2024-10-30 ENCOUNTER — APPOINTMENT (OUTPATIENT)
Dept: VASCULAR SURGERY | Facility: CLINIC | Age: 64
End: 2024-10-30
Payer: COMMERCIAL

## 2024-10-31 ENCOUNTER — APPOINTMENT (OUTPATIENT)
Dept: VASCULAR SURGERY | Facility: CLINIC | Age: 64
End: 2024-10-31
Payer: COMMERCIAL

## 2024-11-04 ENCOUNTER — APPOINTMENT (OUTPATIENT)
Dept: PRIMARY CARE | Facility: CLINIC | Age: 64
End: 2024-11-04
Payer: COMMERCIAL

## 2024-11-04 VITALS — TEMPERATURE: 97.4 F | DIASTOLIC BLOOD PRESSURE: 82 MMHG | SYSTOLIC BLOOD PRESSURE: 132 MMHG

## 2024-11-04 DIAGNOSIS — Z23 ENCOUNTER FOR IMMUNIZATION: ICD-10-CM

## 2024-11-04 PROCEDURE — 90471 IMMUNIZATION ADMIN: CPT | Performed by: FAMILY MEDICINE

## 2024-11-04 PROCEDURE — 90750 HZV VACC RECOMBINANT IM: CPT | Performed by: FAMILY MEDICINE

## 2024-11-06 ENCOUNTER — APPOINTMENT (OUTPATIENT)
Dept: AUDIOLOGY | Facility: CLINIC | Age: 64
End: 2024-11-06

## 2024-11-06 DIAGNOSIS — H90.3 ASYMMETRICAL SENSORINEURAL HEARING LOSS: Primary | ICD-10-CM

## 2024-11-07 NOTE — PROGRESS NOTES
HEARING AID FITTING    RIGHT: RESOUND NEXIA CROS RECHARGEABLE SEBASTIÁN WITH A #1  AND SMALL OPEN DOME WITHOUT RETENTION TAIL  S.N.: [id number]  LEFT: RESOUND NEXIA 9 RECHARGEABLE SEBASTIÁN WITH A #1  AND SMALL OPEN DOME WITHOUT RETENTION TAIL  S.N.: [id number]  WARRANTY EXPIRES: 11/10/2027    Fit the patient with the above listed hearing aids set to 100%.  Discussed use and care of the hearing aids.  Showed the patient how to replace the wax guards and adjust the volume.  Paired his hearing aids to his cell phone for streaming phone calls and audio.  The patient thought the sound of the streaming was a bit scratchy.  Also paired the hearing aids to the Resound Smart 3D shar and demonstrated how to make adjustments using the shar.  In addition to today's verbal instruction of the hearing devices, the patient was given written instructions from the hearing aid . Hearing aid limitations were discussed at length as well as realistic expectations. The patient was advised in order to receive full benefit of amplification, consistent use during all waking hours is recommended.  The repair warranty and the conditions of the right-to-return period were discussed. The patient reports understanding of these conditions. Purchase agreement was signed (see scanned documents).  Patient will return in 2 weeks for a hearing aid check.  January will bill the patient's  insurance.  The patient paid his 15% today.       Appointment time:  4:00-5:00

## 2024-11-20 ENCOUNTER — APPOINTMENT (OUTPATIENT)
Dept: AUDIOLOGY | Facility: CLINIC | Age: 64
End: 2024-11-20
Payer: COMMERCIAL

## 2024-12-13 ENCOUNTER — APPOINTMENT (OUTPATIENT)
Dept: AUDIOLOGY | Facility: CLINIC | Age: 64
End: 2024-12-13
Payer: COMMERCIAL

## 2024-12-13 DIAGNOSIS — H90.3 ASYMMETRICAL SENSORINEURAL HEARING LOSS: Primary | ICD-10-CM

## 2024-12-13 PROCEDURE — HRANC PR HEARING AID NO CHARGE: Performed by: AUDIOLOGIST

## 2024-12-16 NOTE — PROGRESS NOTES
HEARING AID CHECK     RIGHT: RESOUND NEXIA CROS RECHARGEABLE SEBASTIÁN WITH A #1  AND SMALL OPEN DOME S.N.: [id number]  LEFT: RESOUND NEXIA 9 RECHARGEABLE SEBASTIÁN WITH A #1  AND SMALL OPEN DOME S.N.: [id number]  WARRANTY EXPIRES: 11/10/2027     The patient reported that he likes the cros hearing aid system better than the previous one that he tried, but he is still having difficulty hearing in background noise.  He is hearing better when the speaker is on his right side.  Counseled the patient on realistic expectations with the hearing aids, especially in noise.  The patient can try the hear in noise program, but it will still be challenging understanding speech in those settings.  He will take the next few weeks to decide if he wishes to keep the hearing aids.  He will call and let me know.       Appointment time:  10:00-10:30

## 2024-12-27 ENCOUNTER — APPOINTMENT (OUTPATIENT)
Dept: OTOLARYNGOLOGY | Facility: CLINIC | Age: 64
End: 2024-12-27
Payer: COMMERCIAL

## 2025-01-22 ENCOUNTER — HOSPITAL ENCOUNTER (OUTPATIENT)
Dept: RADIOLOGY | Facility: CLINIC | Age: 65
End: 2025-01-22
Payer: COMMERCIAL

## 2025-01-22 ENCOUNTER — HOSPITAL ENCOUNTER (OUTPATIENT)
Dept: RADIOLOGY | Facility: CLINIC | Age: 65
Discharge: HOME | End: 2025-01-22
Payer: COMMERCIAL

## 2025-01-22 DIAGNOSIS — M75.21 BICIPITAL TENDINITIS, RIGHT SHOULDER: ICD-10-CM

## 2025-01-22 PROCEDURE — 73221 MRI JOINT UPR EXTREM W/O DYE: CPT | Mod: RIGHT SIDE | Performed by: RADIOLOGY

## 2025-01-22 PROCEDURE — 73221 MRI JOINT UPR EXTREM W/O DYE: CPT | Mod: RT

## 2025-03-31 ENCOUNTER — APPOINTMENT (OUTPATIENT)
Dept: OTOLARYNGOLOGY | Facility: CLINIC | Age: 65
End: 2025-03-31
Payer: COMMERCIAL

## 2025-03-31 VITALS — WEIGHT: 190 LBS | BODY MASS INDEX: 29.82 KG/M2 | HEIGHT: 67 IN

## 2025-03-31 DIAGNOSIS — H61.23 BILATERAL IMPACTED CERUMEN: ICD-10-CM

## 2025-03-31 DIAGNOSIS — H92.10 OTORRHEA, UNSPECIFIED LATERALITY: ICD-10-CM

## 2025-03-31 DIAGNOSIS — H69.91 DYSFUNCTION OF RIGHT EUSTACHIAN TUBE: Primary | ICD-10-CM

## 2025-03-31 DIAGNOSIS — H92.11 OTORRHEA OF RIGHT EAR: ICD-10-CM

## 2025-03-31 DIAGNOSIS — H90.3 ASYMMETRIC SENSORINEURAL DEAFNESS: ICD-10-CM

## 2025-03-31 PROCEDURE — 99214 OFFICE O/P EST MOD 30 MIN: CPT | Performed by: OTOLARYNGOLOGY

## 2025-03-31 PROCEDURE — 3008F BODY MASS INDEX DOCD: CPT | Performed by: OTOLARYNGOLOGY

## 2025-03-31 PROCEDURE — 1036F TOBACCO NON-USER: CPT | Performed by: OTOLARYNGOLOGY

## 2025-03-31 RX ORDER — CIPROFLOXACIN AND DEXAMETHASONE 3; 1 MG/ML; MG/ML
4 SUSPENSION/ DROPS AURICULAR (OTIC) 2 TIMES DAILY
Qty: 7.5 ML | Refills: 2 | Status: SHIPPED | OUTPATIENT
Start: 2025-03-31 | End: 2025-04-07

## 2025-03-31 NOTE — PROGRESS NOTES
Chief Complaint   Patient presents with    Follow-up     EP-TUBE CK       Date of Evaluation: 3/31/2025   HPI  Angelo Lew is a 64 y.o. male deaf in the right ear with a tube in place.  His audiogram today shows stable profound right-sided sensorineural hearing loss and normal hearing on the left.  His ears feel plugged.  He is using CROS hearing aids  History: former patient of Dr. Davis. He had a right sudden sensorineural hearing loss resulting in a profound hearing loss. He also had right-sided eustachian tube dysfunction requiring placement of a T-tube January 2019. He does swim. He has occasional otorrhea. No change in his hearing recently        Past Medical History:   Diagnosis Date    Fracture of fibula 06/07/2024    Granulation tissue of ear canal 06/07/2024    Hemangioma of liver 06/07/2024    Other specified disorders of eustachian tube, right ear 12/28/2021    Dysfunction of right eustachian tube    Other specified disorders of eustachian tube, right ear 12/28/2021    Dysfunction of right eustachian tube    Otorrhea of right ear 06/07/2024    Pericarditis ([facility]) 06/07/2024    Prolapsed internal hemorrhoids 06/07/2024      Past Surgical History:   Procedure Laterality Date    COLONOSCOPY  2022    per patient and repeat 10 years    CT ANGIO CORONARY ART WITH HEARTFLOW IF SCORE >30%  11/27/2020    CT HEART CORONARY ANGIOGRAM 11/27/2020 [facility] [place]    OTHER SURGICAL HISTORY  05/28/2021    Ankle surgery    OTHER SURGICAL HISTORY  05/28/2021    Back surgery    OTHER SURGICAL HISTORY  12/28/2021    Myringotomy with tube placement    TONSILLECTOMY            Medications:   Current Outpatient Medications   Medication Instructions    aspirin 81 mg, Daily    ciclopirox (Penlac) 8 % solution Topical, Nightly, Wash off all coats once weekly    LORazepam (Ativan) 0.5 mg tablet TAKE 1 TABLET BY MOUTH TWO TIMES A DAY AS NEEDED FOR ANXIETY        Allergies:  Allergies   Allergen Reactions    Monosodium  "Glutamate (Bulk) Headache        Physical Exam:  Last Recorded Vitals  Height 1.702 m (5' 7\"), weight 86.2 kg (190 lb).  []General appearance: Well-developed, well-nourished in no acute distress, conversant with normal voice quality    Head/face: No erythema or edema or facial tenderness, and normal facial nerve function bilaterally    External ear: Clear external auditory canals with normal pinnae, bilateral cerumen impactions removed.    Tube status: Right T-tube in place.  Fluid trapped behind the cerumen impaction with some mild otitis externa around the tube.  Suctioned clear  Middle ear: Tympanic membranes intact and mobile, middle ears normal.  Tympanic membrane perforation: N/A  Mastoid bowl: N/A  Hearing: Normal conversational awareness at normal speech thresholds    Nose visualized using: Anterior rhinoscopy  Nasal dorsum: Nontraumatic midline appearance  Septum: Midline, nonobstructing  Inferior turbinates: Normal, pink  Secretions: Dry    Oral cavity and oropharynx: Normal  Teeth: Good condition  Floor of mouth: without lesions  Palate: Normal hard palate, soft palate and uvula  Oropharynx: Clear, no lesions present  Buccal mucosa: Normal without masses or lesions  Lips: Normal    Nasopharynx: Inadequate mirror exam secondary to gag/anatomy    Neck:  Salivary glands: Normal bilateral parotid and submandibular glands by inspection and palpation.  Non-thyroid masses: No palpable masses or significant lymphadenopathy  Trachea: Midline  Thyroid: No thyromegaly or palpable nodules  Temporomandibular joint: Nontender  Cervical range of motion: Normal    Neurologic exam: Alert and oriented x3, appropriate affect.  Cranial nerves II-XII normal bilaterally  Extraocular movement: Extraocular movement intact, normal gaze alignment        There are no diagnoses linked to this encounter.         PLAN  I have recommended repairing his CROS hearing aids and using them more consistently.  Recheck in 6 months     Rolando" SANGITA Dyer MD

## 2025-04-07 ENCOUNTER — APPOINTMENT (OUTPATIENT)
Dept: ORTHOPEDIC SURGERY | Facility: CLINIC | Age: 65
End: 2025-04-07
Payer: COMMERCIAL

## 2025-04-07 DIAGNOSIS — M77.11 LATERAL EPICONDYLITIS OF RIGHT ELBOW: ICD-10-CM

## 2025-04-07 DIAGNOSIS — G56.21 ULNAR NEUROPATHY AT ELBOW, RIGHT: ICD-10-CM

## 2025-04-07 PROCEDURE — 99204 OFFICE O/P NEW MOD 45 MIN: CPT | Performed by: ORTHOPAEDIC SURGERY

## 2025-04-07 PROCEDURE — 1036F TOBACCO NON-USER: CPT | Performed by: ORTHOPAEDIC SURGERY

## 2025-04-07 ASSESSMENT — PAIN SCALES - GENERAL: PAINLEVEL_OUTOF10: 1

## 2025-04-07 ASSESSMENT — PAIN - FUNCTIONAL ASSESSMENT: PAIN_FUNCTIONAL_ASSESSMENT: 0-10

## 2025-04-08 NOTE — PROGRESS NOTES
History of Present Illness:  Chief Complaint   Patient presents with    Right Elbow - Pain       Patient presents today for evaluation of right elbow pain as well as numbness and tingling into his right small and ring fingers.  Patient initially began experiencing symptoms in August 2024.  He initially had pain about the anterior portion of his elbow that travels somewhat proximally/distally.  He has also developed numbness and tingling into his small and ring fingers that is intermittently worse.  Symptoms seem to be agitated overnight with prolonged elbow flexion and somewhat improved with elbow extension splinting.  He has also begun having more pain about his lateral elbow as well that is worse with increased activities.  MRI of his elbow was obtained in January 2025 and this demonstrated changes consistent with lateral epicondylitis and possible ulnar neuropathy.    Past Medical History:   Diagnosis Date    Fracture of fibula 06/07/2024    Granulation tissue of ear canal 06/07/2024    Hemangioma of liver 06/07/2024    Other specified disorders of eustachian tube, right ear 12/28/2021    Dysfunction of right eustachian tube    Other specified disorders of eustachian tube, right ear 12/28/2021    Dysfunction of right eustachian tube    Otorrhea of right ear 06/07/2024    Pericarditis ([facility]) 06/07/2024    Prolapsed internal hemorrhoids 06/07/2024       Medication Documentation Review Audit       Reviewed by Carolee Hoffman CMA (Medical Assistant) on 04/07/25 at 0952      Medication Order Taking? Sig Documenting Provider Last Dose Status   aspirin 81 mg EC tablet [id number]  Take 1 tablet (81 mg) by mouth once daily. Historical Provider, MD  Active   ciclopirox (Penlac) 8 % solution [id number]  Apply topically once daily at bedtime. Wash off all coats once weekly Kashmir Carrington MD  Active   ciprofloxacin-dexamethasone (CiproDEX) otic suspension [id number]  Use 4 drops into affected ear(s) 2 times a day for 7  days. Rolando Dyer MD  Active   LORazepam (Ativan) 0.5 mg tablet [id number] No TAKE 1 TABLET BY MOUTH TWO TIMES A DAY AS NEEDED FOR ANXIETY Kashmir Carrington MD Taking  24 0826                    Allergies   Allergen Reactions    Monosodium Glutamate (Bulk) Headache       Social History     Socioeconomic History    Marital status:      Spouse name: Not on file    Number of children: Not on file    Years of education: Not on file    Highest education level: Not on file   Occupational History    Not on file   Tobacco Use    Smoking status: Former     Average packs/day: 1 pack/day for 19.0 years (19.0 ttl pk-yrs)     Types: Cigarettes     Start date:     Smokeless tobacco: Never   Vaping Use    Vaping status: Never Used   Substance and Sexual Activity    Alcohol use: Yes     Comment: occasional    Drug use: Never    Sexual activity: Not on file   Other Topics Concern    Not on file   Social History Narrative    Not on file     Social Drivers of Health     Financial Resource Strain: Not on file   Food Insecurity: Not on file   Transportation Needs: Not on file   Physical Activity: Not on file   Stress: Not on file   Social Connections: Not on file   Intimate Partner Violence: Not on file   Housing Stability: Not on file       Past Surgical History:   Procedure Laterality Date    COLONOSCOPY      per patient and repeat 10 years    CT ANGIO CORONARY ART WITH HEARTFLOW IF SCORE >30%  2020    [place]ART [place] [facility] [place]    OTHER SURGICAL HISTORY  2021    Ankle surgery    OTHER SURGICAL HISTORY  2021    Back surgery    OTHER SURGICAL HISTORY  2021    Myringotomy with tube placement    TONSILLECTOMY          Review of Systems   GENERAL: Negative for malaise, significant weight loss, fever  MUSCULOSKELETAL: see HPI  NEURO: See HPI     Physical Examination  Constitutional: Appears well-developed and well-nourished.  Head: Normocephalic and  atraumatic.  Eyes: EOMI grossly  Cardiovascular: Intact distal pulses.   Respiratory: Effort normal. No respiratory distress.  Neurologic: Alert and oriented to person, place, and time.  Skin: Skin is warm and dry.  Hematologic / Lymphatic: No lymphedema, lymphangitis.  Psychiatric: normal mood and affect. Behavior is normal.   Musculoskeletal:  Right arm: Elbow with 0-135 degrees flexion.  80/80 degrees pronation/supination.  Tenderness about the lateral epicondyle that is reproduced with resisted wrist and finger extension.  Tenderness overlying ulnar nerve at level of elbow with positive Tinel's and some changes with elbow flexion test.  5/5 thumb abduction/finger abduction.  Negative Wartenberg's.  Subjectively diminished sensation small and ring fingers.  Sensation otherwise intact.  2+ radial pulse.  Negative Tinel's at level of carpal tunnel.  Negative Durkan's.    Radiographs: See HPI for January 2025 MRI results     Assessment:  Patient with right elbow lateral epicondylitis and ulnar neuropathy with preserved strength     Plan:  Nature of the diagnoses was discussed with the patient.  We discussed risks and benefits of various treatment options.  Treatment options including therapy, tennis elbow strap, overnight wrist brace, corticosteroid injections and role of potential surgery discussed.  After thoroughly discussing, plan for therapy referral for guided home exercise program.  Recommend continued avoidance of prolonged elbow flexion and pressure on medial elbow.  If persistent symptoms EMG has been ordered for further evaluation and I will see him back after EMG has been completed for further review/management planning.

## 2025-04-14 ENCOUNTER — EVALUATION (OUTPATIENT)
Dept: OCCUPATIONAL THERAPY | Facility: CLINIC | Age: 65
End: 2025-04-14
Payer: COMMERCIAL

## 2025-04-14 DIAGNOSIS — G56.21 ULNAR NEUROPATHY AT ELBOW, RIGHT: ICD-10-CM

## 2025-04-14 DIAGNOSIS — M77.11 LATERAL EPICONDYLITIS OF RIGHT ELBOW: ICD-10-CM

## 2025-04-14 PROCEDURE — 97165 OT EVAL LOW COMPLEX 30 MIN: CPT | Mod: GO

## 2025-04-14 NOTE — PROGRESS NOTES
Occupational Therapy  Occupational Therapy Orthopedic Evaluation    Patient Name: Angelo Lew  MRN: [id number]  Today's Date: 4/14/2025  Time Calculation  Start Time: 1115  Stop Time: 1145  Time Calculation (min): 30 min  OT Evaluation Time Entry  OT Evaluation (Low) Time Entry: 30,  ,      Insurance:  Visit number: 1   Authorization info: auth required   Insurance Type:  Employee    General:  Reason for visit: R elbow pain, finger numbess  Referred by: Dr. Rose     Current Problem  Problem List Items Addressed This Visit             ICD-10-CM    Lateral epicondylitis of right elbow M77.11    Relevant Orders    Follow Up In Occupational Therapy    Ulnar neuropathy at elbow, right G56.21    Relevant Orders    Follow Up In Occupational Therapy       Precautions: none          Medical History Form: Reviewed (scanned into chart)    Subjective:   Chief Complaint: R lateral elbow/forearm pain,  tingling to ring and small fingers  Onset: 1/20/2025  LATISHA: Insidious         Hand Dominance: Right    Current Condition since injury:   same     PAIN     Location: R lateral elbow pain   Description: intermiittent  Sore, sharp, ache  5/10  Aggravating Factors: Gripping/pinching and Lifting/Carrying   Relieving Factors: Rest , elbow brace at night for sleeping     Relevant Information (PMH & Previous Tests/Imaging): MRI showed tendinosis of wrist extensor tendon origin and abnormal ulnar nerve at cubital tunnel   Previous Interventions/Treatments: None     Prior Level of Function (PLOF)  Exercise/Physical Activity: swimming, biking   Work/School: Business owner   Current ADL/IADL Status: Independent     Patients Living Environment: Reviewed and no concern    Primary Language: English    Pt goals for therapy: no pain    Red Flags: Do you have any of the following? No  Fever/chills, unexplained weight changes, dizziness/fainting, unexplained change in bowel or bladder functions, unexplained malaise or muscle weakness, night  pain/sweats, numbness or tingling    Objective:    Right Hand AROM (degrees)   MCP PIP DIP DPC   IF     0   MF    0   RF    0   SF    0   Thumb       Thumb RABD       Thumb PABD         Left Hand AROM (degrees)   MCP PIP DIP DPC   IF     0   MF    0   RF    0   SF    0   Thumb       Thumb RABD       Thumb PABD        WRIST STRENGTH (MMT)   R L   Extension 5 5   Flexion 5 5   Radial Deviation 5 5   Ulnar Deviation 5 5   Pronation 5 5   Supination 5 5       Hand Strength Measures (lbs)   R L   Dynamometer  70 80   Lateral Pinch     3jaw Pinch          Special Tests  +Elbow flexion test   + Handshake test  +Cozens test   +Point tenderness over lateral epicondyle   No pain or tingling with repeated cervical motions   AIN Weakness: negative   PIN Weakness: negative       Edema: none     Sensory: WNL   Numbness/Tingling: Reports intermittent tingling and numbness to R small and ring finger         Outcome Measures:  UEFI: 72/80    EDUCATION: home exercise program, plan of care, activity modifications, pain management, and injury pathology   Access Code: [email or web address]  URL: [email or web address]  Date: 04/14/2025  Prepared by: Bernardo Alcantara    Exercises  - Ulnar Nerve Flossing  - 2-3 x daily - 7 x weekly - 1 sets - 10 reps - 5 hold  - Standing Wrist Extension Stretch  - 2-3 x daily - 7 x weekly - 3 sets - 10 reps - 30 hold  - Standing Wrist Flexion Stretch  - 2-3 x daily - 7 x weekly - 3 sets - 10 reps - 30 hold  - Eccentric Wrist Extension with Resistance  - 2 x daily - 7 x weekly - 3 sets - 10 reps - 10 hold  - Tennis Elbow Self Massage  - 7 x weekly    Goals: 6-8 weeks  1. Pt will increase R  strength 10-15 lb to improve ability to open jars and lift bags with R   2. Pt will have 0/10 R elbow pain with active use/lifting/gripping   3. Pt will be independent with HEP to support progress   4. Pt will report no episodes of numbness/tingling while sleeping       Assessment: Patient is a 65 yo RHD  male presenting with R lateral elbow pain and RF and SF numbness and tingling  resulting in limited participation in pain-free ADLs and inability to perform at their prior level of function. Pt would benefit from occupational therapy to address the impairments found & listed previously in the objective section in order to return to safe and pain-free ADLs and prior level of function.       Clinical Presentation: Evolving with changing characteristics  Personal Factors Impacting Care: None    Plan:      Planned Interventions include: therapeutic exercise, therapeutic activity, self-care home management, manual therapy, therapeutic activities, electric stimulation, fluidotherapy, ultrasound, kinesiotaping  Frequency: 1 x Week  Duration: 6 Weeks    Ambulatory Screening Summary:      Screening  Frequency  Date Last Completed   Spiritual and Cultural Beliefs   Screening each visit or episode of care    Falls Risk Screening every ambulatory visit    Pain Screening annually at primary care visit  7/19/2024   Domestic Violence screening annually at primary care visit    Depression Screening annually in the primary care setting 7/19/2024   Suicide Risk Screening annually in the primary care setting    Nutrition and Food Insecurity   Screening at least annually at primary care visit     Key Learner annually in the primary care setting    Drug Screen  7/19/2024  1:23 PM   Alcohol Screen  7/19/2024  1:23 PM   Advance Directive         Bernardo Alcantara, OT

## 2025-04-21 ENCOUNTER — TREATMENT (OUTPATIENT)
Dept: OCCUPATIONAL THERAPY | Facility: CLINIC | Age: 65
End: 2025-04-21
Payer: COMMERCIAL

## 2025-04-21 ENCOUNTER — TELEPHONE (OUTPATIENT)
Dept: PRIMARY CARE | Facility: CLINIC | Age: 65
End: 2025-04-21

## 2025-04-21 DIAGNOSIS — Z13.220 LIPID SCREENING: ICD-10-CM

## 2025-04-21 DIAGNOSIS — N40.0 BENIGN PROSTATIC HYPERPLASIA, UNSPECIFIED WHETHER LOWER URINARY TRACT SYMPTOMS PRESENT: ICD-10-CM

## 2025-04-21 DIAGNOSIS — G56.21 ULNAR NEUROPATHY AT ELBOW, RIGHT: ICD-10-CM

## 2025-04-21 DIAGNOSIS — Z00.00 WELL ADULT EXAM: ICD-10-CM

## 2025-04-21 DIAGNOSIS — M77.11 LATERAL EPICONDYLITIS OF RIGHT ELBOW: ICD-10-CM

## 2025-04-21 DIAGNOSIS — R73.01 IMPAIRED FASTING GLUCOSE: ICD-10-CM

## 2025-04-21 PROCEDURE — 97110 THERAPEUTIC EXERCISES: CPT | Mod: GO

## 2025-04-21 PROCEDURE — 97140 MANUAL THERAPY 1/> REGIONS: CPT | Mod: GO

## 2025-04-21 PROCEDURE — 97035 APP MDLTY 1+ULTRASOUND EA 15: CPT | Mod: GO

## 2025-04-21 NOTE — PROGRESS NOTES
"Occupational Therapy    Occupational Therapy Treatment    Name: Angelo Lew  MRN: [id number]  : 1960  Date: 2025  Time Calculation  Start Time: 935  Stop Time: 1015  Time Calculation (min): 40 min  OT Therapeutic Procedures Time Entry  Manual Therapy Time Entry: 15  Therapeutic Exercise Time Entry: 17, OT Modalities Time Entry  Ultrasound Time Entry: 8  Insurance:  Visit number: 2  Authorization info: auth required   Insurance Type:  Employee  Current Problem:  Problem List Items Addressed This Visit           ICD-10-CM    Lateral epicondylitis of right elbow M77.11    Ulnar neuropathy at elbow, right G56.21       Assessment: Pt tolerated session well. Continues to report intermittent pain R lateral forearm that travels to upper arm. Reported slight improvement in discomfort post treatment.     Plan: Continue to work on STM of wrist extensors and PRE's as tolerated        Subjective \"I think it still feels about the same\"  General:        Pain Assessment:   Location: R lateral elbow pain   Description: intermiittent  Sore, sharp, ache  5/10  Aggravating Factors: Gripping/pinching and Lifting/Carrying   Relieving Factors: Rest , elbow brace at night for sleeping     Objective      Modalities:   US x 8 min over lateral forearm @ 3mhz, 1.0w/cm2, 100%  Communication:     Splinting:     Therapeutic Exercise   Wrist twist x 6 x2 with 2 lbs   Wrist flexion and extension x 20x2 with 4 lbs   Pro/sup x 20x2 with red flexbar   Manual Therapy   IASTM to wrist extensors, biceps   Wrist flexor and extensor stretch   Ice massage over lateral forearm x 5 min     Therapy/Activity:   Strength:     Other Activity:     Outcome Measures:      OP EDUCATION:       Goals: 6-8 weeks  1. Pt will increase R  strength 10-15 lb to improve ability to open jars and lift bags with R   2. Pt will have 0/10 R elbow pain with active use/lifting/gripping   3. Pt will be independent with HEP to support progress   4. Pt will report no " episodes of numbness/tingling while sleeping

## 2025-04-21 NOTE — TELEPHONE ENCOUNTER
Patient requesting a referral for psychology for his anxiety, so he can continue getting his medication.    Contact: [phone]

## 2025-04-22 ENCOUNTER — TELEPHONE (OUTPATIENT)
Dept: PRIMARY CARE | Facility: CLINIC | Age: 65
End: 2025-04-22
Payer: COMMERCIAL

## 2025-04-22 ENCOUNTER — PHARMACY VISIT (OUTPATIENT)
Dept: PHARMACY | Facility: CLINIC | Age: 65
End: 2025-04-22
Payer: COMMERCIAL

## 2025-04-22 DIAGNOSIS — F41.9 ANXIETY DISORDER, UNSPECIFIED TYPE: Primary | ICD-10-CM

## 2025-04-22 PROCEDURE — RXMED WILLOW AMBULATORY MEDICATION CHARGE

## 2025-04-22 RX ORDER — LORAZEPAM 0.5 MG/1
0.5 TABLET ORAL 2 TIMES DAILY PRN
Qty: 20 TABLET | Refills: 0 | Status: SHIPPED | OUTPATIENT
Start: 2025-04-22 | End: 2025-10-19

## 2025-04-22 NOTE — TELEPHONE ENCOUNTER
Patient is aware that he needs to call the number on the back of his insurance card to get mental health phone number and recommendation.  He will call back if he has any problems

## 2025-04-22 NOTE — TELEPHONE ENCOUNTER
Patient called back and stated he spoke with his insurance and was told he needs a Referral for Psychology to get an appointment with Behavorial Health     Patient can be reached at [phone], please call Patient once referral has been placed.

## 2025-04-25 ENCOUNTER — APPOINTMENT (OUTPATIENT)
Dept: OCCUPATIONAL THERAPY | Facility: CLINIC | Age: 65
End: 2025-04-25
Payer: COMMERCIAL

## 2025-04-28 ENCOUNTER — TREATMENT (OUTPATIENT)
Dept: OCCUPATIONAL THERAPY | Facility: CLINIC | Age: 65
End: 2025-04-28
Payer: COMMERCIAL

## 2025-04-28 DIAGNOSIS — G56.21 ULNAR NEUROPATHY AT ELBOW, RIGHT: ICD-10-CM

## 2025-04-28 DIAGNOSIS — M77.11 LATERAL EPICONDYLITIS OF RIGHT ELBOW: ICD-10-CM

## 2025-04-28 PROCEDURE — 97110 THERAPEUTIC EXERCISES: CPT | Mod: GO

## 2025-04-28 PROCEDURE — 97035 APP MDLTY 1+ULTRASOUND EA 15: CPT | Mod: GO

## 2025-04-28 PROCEDURE — 97140 MANUAL THERAPY 1/> REGIONS: CPT | Mod: GO

## 2025-04-28 NOTE — PROGRESS NOTES
"  Occupational Therapy    Occupational Therapy Treatment    Name: Angelo Lew  MRN: [id number]  : 1960  Date: 2025  Time Calculation  Start Time: 935  Stop Time: 101  Time Calculation (min): 40 min  OT Therapeutic Procedures Time Entry  Manual Therapy Time Entry: 17  Therapeutic Exercise Time Entry: 15, OT Modalities Time Entry  Ultrasound Time Entry: 8  Insurance:  Visit number: 3  Authorization info: auth required   Insurance Type:  Employee  Current Problem:  Problem List Items Addressed This Visit           ICD-10-CM    Lateral epicondylitis of right elbow M77.11    Ulnar neuropathy at elbow, right G56.21       Assessment: Pt tolerated session well. Continues to report intermittent pain R lateral forearm that travels to upper arm. Reported slight improvement in discomfort post treatment.     Plan: Continue to work on STM of wrist extensors and PRE's as tolerated        Subjective \"It felt better for a little while, but it came back\"  General:        Pain Assessment:   Location: R lateral elbow pain   Description: intermiittent  Sore, sharp, ache  5/10  Aggravating Factors: Gripping/pinching and Lifting/Carrying   Relieving Factors: Rest , elbow brace at night for sleeping     Objective      Modalities:   US x 8 min over lateral forearm @ 3mhz, 1.0w/cm2, 100%  Communication:     Splinting:     Therapeutic Exercise   Wrist twist x 6 x 3 with 2 lbs   Wrist flexion and extension x 20x2 with 4 lbs   Pro/sup x 20x2 with red flexbar   Manual Therapy   IASTM to wrist extensors, biceps   Wrist flexor and extensor stretch   Ice massage over lateral forearm x 5 min     Therapy/Activity:   Strength:     Other Activity:     Outcome Measures:      OP EDUCATION:       Goals: 6-8 weeks  1. Pt will increase R  strength 10-15 lb to improve ability to open jars and lift bags with R   2. Pt will have 0/10 R elbow pain with active use/lifting/gripping   3. Pt will be independent with HEP to support progress   4. " Pt will report no episodes of numbness/tingling while sleeping

## 2025-05-05 ENCOUNTER — TREATMENT (OUTPATIENT)
Dept: OCCUPATIONAL THERAPY | Facility: CLINIC | Age: 65
End: 2025-05-05
Payer: COMMERCIAL

## 2025-05-05 DIAGNOSIS — G56.21 ULNAR NEUROPATHY AT ELBOW, RIGHT: ICD-10-CM

## 2025-05-05 DIAGNOSIS — M77.11 LATERAL EPICONDYLITIS OF RIGHT ELBOW: ICD-10-CM

## 2025-05-05 PROCEDURE — 97140 MANUAL THERAPY 1/> REGIONS: CPT | Mod: GO

## 2025-05-05 PROCEDURE — 97110 THERAPEUTIC EXERCISES: CPT | Mod: GO

## 2025-05-05 PROCEDURE — 97035 APP MDLTY 1+ULTRASOUND EA 15: CPT | Mod: GO

## 2025-05-05 NOTE — PROGRESS NOTES
"    Occupational Therapy    Occupational Therapy Treatment/Discharge Summary    Name: Angelo Lew  MRN: [id number]  : 1960  Date: 2025  Time Calculation  Start Time: 935  Stop Time:   Time Calculation (min): 40 min  OT Therapeutic Procedures Time Entry  Manual Therapy Time Entry: 15  Therapeutic Exercise Time Entry: 17, OT Modalities Time Entry  Ultrasound Time Entry: 8  Insurance:  Visit number: 4  Authorization info: auth required   Insurance Type:  Employee  Current Problem:  Problem List Items Addressed This Visit           ICD-10-CM    Lateral epicondylitis of right elbow M77.11    Ulnar neuropathy at elbow, right G56.21       Assessment: Pt tolerated session well. Continues to report intermittent pain R lateral forearm that travels to different parts of RUE. Pt prefers to continue treatment on his own until he follows up with physician      Plan: Discharge OT        Subjective \"The pain moves all over \"  General:        Pain Assessment:   Location: R lateral elbow pain   Description: intermiittent  Sore, sharp, ache  5/10  Aggravating Factors: Gripping/pinching and Lifting/Carrying   Relieving Factors: Rest , elbow brace at night for sleeping     Objective   strength: 80 lbs ( was 70 lbs)    Modalities:   US x 8 min over lateral forearm @ 3mhz, 1.0w/cm2, 100%  Communication:     Splinting:     Therapeutic Exercise   Wrist twist x 6 x 3 with 2 lbs   Wrist flexion and extension x 20x2 with 4 lbs   Pro/sup x 20x2 with red flexbar   Triceps stretch   Manual Therapy   IASTM to wrist extensors, biceps, triceps  Wrist flexor and extensor stretch   Ice massage over lateral forearm x 5 min     Therapy/Activity:   Strength:     Other Activity:     Outcome Measures:      OP EDUCATION:       Goals: 6-8 weeks  1. Pt will increase R  strength 10-15 lb to improve ability to open jars and lift bags with R. Met   2. Pt will have 0/10 R elbow pain with active use/lifting/gripping   3. Pt will be " independent with HEP to support progress   4. Pt will report no episodes of numbness/tingling while sleeping

## 2025-06-05 ENCOUNTER — OFFICE VISIT (OUTPATIENT)
Dept: ORTHOPEDIC SURGERY | Facility: CLINIC | Age: 65
End: 2025-06-05
Payer: COMMERCIAL

## 2025-06-05 DIAGNOSIS — G56.21 ULNAR NEUROPATHY AT ELBOW, RIGHT: ICD-10-CM

## 2025-06-05 PROCEDURE — 99213 OFFICE O/P EST LOW 20 MIN: CPT | Performed by: ORTHOPAEDIC SURGERY

## 2025-06-05 PROCEDURE — 99203 OFFICE O/P NEW LOW 30 MIN: CPT | Performed by: ORTHOPAEDIC SURGERY

## 2025-06-05 ASSESSMENT — PAIN - FUNCTIONAL ASSESSMENT: PAIN_FUNCTIONAL_ASSESSMENT: NO/DENIES PAIN

## 2025-06-08 NOTE — PROGRESS NOTES
History of Present Illness:  Chief Complaint   Patient presents with    Right Elbow - Follow-up     right elbow lateral epicondylitis and ulnar neuropathy     Patient was last seen about 2 months ago for right elbow lateral epicondylitis as well as ulnar neuropathy with intermittent symptoms and preserved strength.  Patient was referred to therapy, which patient attended, but he remains symptomatic.  Continues to have intermittent numbness and tingling into his small and ring fingers.  Also with an aching/nagging pain about the lateral elbow/proximal lateral forearm that is worse with activities.    Past Medical History:   Diagnosis Date    Fracture of fibula 06/07/2024    Granulation tissue of ear canal 06/07/2024    Hemangioma of liver 06/07/2024    Other specified disorders of eustachian tube, right ear 12/28/2021    Dysfunction of right eustachian tube    Other specified disorders of eustachian tube, right ear 12/28/2021    Dysfunction of right eustachian tube    Otorrhea of right ear 06/07/2024    Pericarditis ([facility]) 06/07/2024    Prolapsed internal hemorrhoids 06/07/2024       Medication Documentation Review Audit       Reviewed by Carolee Mcallister CMA (Medical Assistant) on 06/05/25 at 0923      Medication Order Taking? Sig Documenting Provider Last Dose Status   aspirin 81 mg EC tablet [id number]  Take 1 tablet (81 mg) by mouth once daily. Historical Provider, MD  Active   ciclopirox (Penlac) 8 % solution [id number]  Apply topically once daily at bedtime. Wash off all coats once weekly Kashmir Carrington MD  Active   LORazepam (Ativan) 0.5 mg tablet [id number]  TAKE 1 TABLET BY MOUTH TWO TIMES A DAY AS NEEDED FOR ANXIETY Kashmir Carrington MD  Active                    Allergies   Allergen Reactions    Monosodium Glutamate (Bulk) Headache       Social History     Socioeconomic History    Marital status:      Spouse name: Not on file    Number of children: Not on file    Years of education: Not on file     Highest education level: Not on file   Occupational History    Not on file   Tobacco Use    Smoking status: Former     Average packs/day: 1 pack/day for 19.0 years (19.0 ttl pk-yrs)     Types: Cigarettes     Start date: 1981    Smokeless tobacco: Never   Vaping Use    Vaping status: Never Used   Substance and Sexual Activity    Alcohol use: Yes     Comment: occasional    Drug use: Never    Sexual activity: Not on file   Other Topics Concern    Not on file   Social History Narrative    Not on file     Social Drivers of Health     Financial Resource Strain: Not on file   Food Insecurity: Not on file   Transportation Needs: Not on file   Physical Activity: Not on file   Stress: Not on file   Social Connections: Not on file   Intimate Partner Violence: Not on file   Housing Stability: Not on file       Past Surgical History:   Procedure Laterality Date    COLONOSCOPY  2022    per patient and repeat 10 years    CT ANGIO CORONARY ART WITH HEARTFLOW IF SCORE >30%  11/27/2020    CT HEART CORONARY ANGIOGRAM 11/27/2020 [facility] [place] ANCILLARY LEGACY    OTHER SURGICAL HISTORY  05/28/2021    Ankle surgery    OTHER SURGICAL HISTORY  05/28/2021    Back surgery    OTHER SURGICAL HISTORY  12/28/2021    Myringotomy with tube placement    TONSILLECTOMY          Review of Systems   GENERAL: Negative for malaise, significant weight loss, fever  MUSCULOSKELETAL: see HPI  NEURO: See HPI     Physical Examination  Constitutional: Appears well-developed and well-nourished.  Head: Normocephalic and atraumatic.  Eyes: EOMI grossly  Cardiovascular: Intact distal pulses.   Respiratory: Effort normal. No respiratory distress.  Neurologic: Alert and oriented to person, place, and time.  Skin: Skin is warm and dry.  Hematologic / Lymphatic: No lymphedema, lymphangitis.  Psychiatric: normal mood and affect. Behavior is normal.   Musculoskeletal:  Right arm: Elbow with 0-135 degrees flexion.  80/80 degrees pronation/supination.  Continued tenderness about  the lateral epicondyle that is reproduced with resisted wrist and finger extension.  Tenderness overlying ulnar nerve at level of elbow with positive Tinel's and + elbow flexion test.  5/5 thumb abduction/finger abduction.  Negative Wartenberg's.  Subjectively diminished sensation small and ring fingers.  Sensation otherwise intact.  2+ radial pulse.     Assessment:  Patient with right elbow lateral epicondylitis and ulnar neuropathy with preserved strength     Plan:  We once again reviewed likely diagnoses.  Recommend continued conservative treatment for lateral epicondylitis.  We did discuss other interventions including corticosteroid injections and potential procedures if he remains symptomatic.  Patient reports that symptoms are not bothersome enough at this time that he wishes to do anything more aggressive with the lateral epicondylitis.    With regards to the intermittent numbness and tingling into the small and ring fingers EMG has been ordered to further assess.  Follow-up after EMG completed for further review/management planning.

## 2025-06-10 ENCOUNTER — DOCUMENTATION (OUTPATIENT)
Dept: AUDIOLOGY | Facility: CLINIC | Age: 65
End: 2025-06-10
Payer: COMMERCIAL

## 2025-06-10 ENCOUNTER — NURSE TRIAGE (OUTPATIENT)
Dept: AUDIOLOGY | Facility: CLINIC | Age: 65
End: 2025-06-10
Payer: COMMERCIAL

## 2025-06-10 NOTE — TELEPHONE ENCOUNTER
[employer] Call notes copied by Olivia Altman on Tuesday Juana 10, 2025  2:30 PM  ------  Documentation by Olivia Altman. [[id number]] 6/9/2025 11:34 AM  [facility] hearing aids- have right turned up [phone]

## 2025-06-10 NOTE — PROGRESS NOTES
AUDIOLOGY ADULT HEARING AID MICROPHONE CASING REPLACEMENT    Name:  Angelo Lew  :  1960  Age:  64 y.o.  Date of Service:  Juana 10, 2025    Reason for visit: Mr. Lew is seen in the clinic today by audiology assistant for a hearing aid repair appointment. Patient complains that his GN Resound Nexia 9 hearing aid does not seem to be benefitting him as noticably as it had in the past.      Pre-Repair Listening Check: Right: Poor Left: Poor   Hearing aid repair:  Hearing aid microphone casing was replaced on both hearing aids. As warm gray casing is not yet available casing was replaced with bronze; receivers and domes were replaced as well. Angelo would like to be contacted when warm gray casing arrives so that his casing can be replaced properly.    Mr. Lew connected to his hearing aids to his Smart AGLOGIC shar after casing was replaced; he felt that the shar allowed him to adjust the volume louder than pre-repair. He was scheduled for a HAC with his audiologist in August as he would like his hearing aids turned up; he declined an audio when being scheduled.

## 2025-06-13 ENCOUNTER — NURSE TRIAGE (OUTPATIENT)
Dept: AUDIOLOGY | Facility: CLINIC | Age: 65
End: 2025-06-13
Payer: COMMERCIAL

## 2025-06-16 ENCOUNTER — NURSE TRIAGE (OUTPATIENT)
Dept: AUDIOLOGY | Facility: CLINIC | Age: 65
End: 2025-06-16
Payer: COMMERCIAL

## 2025-06-17 ENCOUNTER — NURSE TRIAGE (OUTPATIENT)
Dept: AUDIOLOGY | Facility: CLINIC | Age: 65
End: 2025-06-17
Payer: COMMERCIAL

## 2025-06-17 NOTE — TELEPHONE ENCOUNTER
Ironing out appointment questions. Mr. Lew feels hearing has improved since casing replacement and somehow is now able to turn hearing aids up more than had been possible before replacement.

## 2025-07-01 DIAGNOSIS — R73.01 IMPAIRED FASTING GLUCOSE: ICD-10-CM

## 2025-07-01 DIAGNOSIS — Z13.220 LIPID SCREENING: ICD-10-CM

## 2025-07-01 DIAGNOSIS — N40.0 BENIGN PROSTATIC HYPERPLASIA, UNSPECIFIED WHETHER LOWER URINARY TRACT SYMPTOMS PRESENT: ICD-10-CM

## 2025-07-01 DIAGNOSIS — Z00.00 WELL ADULT EXAM: ICD-10-CM

## 2025-07-16 PROCEDURE — 88305 TISSUE EXAM BY PATHOLOGIST: CPT | Performed by: DERMATOLOGY

## 2025-07-18 ENCOUNTER — LAB REQUISITION (OUTPATIENT)
Dept: DERMATOPATHOLOGY | Facility: CLINIC | Age: 65
End: 2025-07-18
Payer: COMMERCIAL

## 2025-07-18 DIAGNOSIS — L98.9 DISORDER OF THE SKIN AND SUBCUTANEOUS TISSUE, UNSPECIFIED: ICD-10-CM

## 2025-07-23 LAB
CLINICAL SIG UPDATED INFO: NORMAL
LABORATORY COMMENT REPORT: NORMAL
PATH REPORT.FINAL DX SPEC: NORMAL
PATH REPORT.GROSS SPEC: NORMAL
PATH REPORT.MICROSCOPIC SPEC OTHER STN: NORMAL
PATH REPORT.RELEVANT HX SPEC: NORMAL
PATH REPORT.TOTAL CANCER: NORMAL

## 2025-08-01 ENCOUNTER — APPOINTMENT (OUTPATIENT)
Dept: AUDIOLOGY | Facility: CLINIC | Age: 65
End: 2025-08-01

## 2025-08-01 ENCOUNTER — APPOINTMENT (OUTPATIENT)
Dept: PRIMARY CARE | Facility: CLINIC | Age: 65
End: 2025-08-01
Payer: COMMERCIAL

## 2025-08-21 ENCOUNTER — APPOINTMENT (OUTPATIENT)
Dept: OTOLARYNGOLOGY | Facility: CLINIC | Age: 65
End: 2025-08-21
Payer: COMMERCIAL

## 2025-08-21 ENCOUNTER — APPOINTMENT (OUTPATIENT)
Dept: AUDIOLOGY | Facility: CLINIC | Age: 65
End: 2025-08-21
Payer: COMMERCIAL

## 2025-08-30 LAB
ALBUMIN SERPL-MCNC: 4.3 G/DL (ref 3.6–5.1)
ALP SERPL-CCNC: 66 U/L (ref 35–144)
ALT SERPL-CCNC: 12 U/L (ref 9–46)
ANION GAP SERPL CALCULATED.4IONS-SCNC: 8 MMOL/L (CALC) (ref 7–17)
APPEARANCE UR: CLEAR
AST SERPL-CCNC: 12 U/L (ref 10–35)
BACTERIA #/AREA URNS HPF: ABNORMAL /HPF
BASOPHILS # BLD AUTO: 62 CELLS/UL (ref 0–200)
BASOPHILS NFR BLD AUTO: 1 %
BILIRUB SERPL-MCNC: 0.5 MG/DL (ref 0.2–1.2)
BILIRUB UR QL STRIP: NEGATIVE
BUN SERPL-MCNC: 20 MG/DL (ref 7–25)
CALCIUM SERPL-MCNC: 8.9 MG/DL (ref 8.6–10.3)
CHLORIDE SERPL-SCNC: 102 MMOL/L (ref 98–110)
CHOLEST SERPL-MCNC: 206 MG/DL
CHOLEST/HDLC SERPL: 2.1 (CALC)
CO2 SERPL-SCNC: 27 MMOL/L (ref 20–32)
COLOR UR: YELLOW
CREAT SERPL-MCNC: 1.26 MG/DL (ref 0.7–1.35)
EGFRCR SERPLBLD CKD-EPI 2021: 64 ML/MIN/1.73M2
EOSINOPHIL # BLD AUTO: 50 CELLS/UL (ref 15–500)
EOSINOPHIL NFR BLD AUTO: 0.8 %
ERYTHROCYTE [DISTWIDTH] IN BLOOD BY AUTOMATED COUNT: 13 % (ref 11–15)
EST. AVERAGE GLUCOSE BLD GHB EST-MCNC: 114 MG/DL
EST. AVERAGE GLUCOSE BLD GHB EST-SCNC: 6.3 MMOL/L
GLUCOSE SERPL-MCNC: 123 MG/DL (ref 65–139)
GLUCOSE UR QL STRIP: NEGATIVE
HBA1C MFR BLD: 5.6 %
HCT VFR BLD AUTO: 40.1 % (ref 38.5–50)
HDLC SERPL-MCNC: 96 MG/DL
HGB BLD-MCNC: 13.3 G/DL (ref 13.2–17.1)
HGB UR QL STRIP: NEGATIVE
HYALINE CASTS #/AREA URNS LPF: ABNORMAL /LPF
KETONES UR QL STRIP: NEGATIVE
LDLC SERPL CALC-MCNC: 93 MG/DL (CALC)
LEUKOCYTE ESTERASE UR QL STRIP: ABNORMAL
LYMPHOCYTES # BLD AUTO: 1426 CELLS/UL (ref 850–3900)
LYMPHOCYTES NFR BLD AUTO: 23 %
MCH RBC QN AUTO: 30.3 PG (ref 27–33)
MCHC RBC AUTO-ENTMCNC: 33.2 G/DL (ref 32–36)
MCV RBC AUTO: 91.3 FL (ref 80–100)
MONOCYTES # BLD AUTO: 446 CELLS/UL (ref 200–950)
MONOCYTES NFR BLD AUTO: 7.2 %
NEUTROPHILS # BLD AUTO: 4216 CELLS/UL (ref 1500–7800)
NEUTROPHILS NFR BLD AUTO: 68 %
NITRITE UR QL STRIP: NEGATIVE
NONHDLC SERPL-MCNC: 110 MG/DL (CALC)
PH UR STRIP: 5.5 [PH] (ref 5–8)
PLATELET # BLD AUTO: 302 THOUSAND/UL (ref 140–400)
PMV BLD REES-ECKER: 10.9 FL (ref 7.5–12.5)
POTASSIUM SERPL-SCNC: 4.2 MMOL/L (ref 3.5–5.3)
PROT SERPL-MCNC: 6.9 G/DL (ref 6.1–8.1)
PROT UR QL STRIP: NEGATIVE
PSA SERPL-MCNC: 0.87 NG/ML
RBC # BLD AUTO: 4.39 MILLION/UL (ref 4.2–5.8)
RBC #/AREA URNS HPF: ABNORMAL /HPF
SERVICE CMNT-IMP: ABNORMAL
SODIUM SERPL-SCNC: 137 MMOL/L (ref 135–146)
SP GR UR STRIP: 1.01 (ref 1–1.03)
SQUAMOUS #/AREA URNS HPF: ABNORMAL /HPF
TRIGL SERPL-MCNC: 77 MG/DL
WBC # BLD AUTO: 6.2 THOUSAND/UL (ref 3.8–10.8)
WBC #/AREA URNS HPF: ABNORMAL /HPF

## 2025-09-08 ENCOUNTER — APPOINTMENT (OUTPATIENT)
Dept: PRIMARY CARE | Facility: CLINIC | Age: 65
End: 2025-09-08
Payer: COMMERCIAL

## 2025-10-06 ENCOUNTER — APPOINTMENT (OUTPATIENT)
Dept: OTOLARYNGOLOGY | Facility: CLINIC | Age: 65
End: 2025-10-06
Payer: COMMERCIAL